# Patient Record
Sex: FEMALE | Race: OTHER
[De-identification: names, ages, dates, MRNs, and addresses within clinical notes are randomized per-mention and may not be internally consistent; named-entity substitution may affect disease eponyms.]

---

## 2018-04-20 NOTE — RADIOLOGY REPORT (SQ)
EXAM DESCRIPTION:  WRIST LEFT 3 VIEWS



COMPLETED DATE/TIME:  4/20/2018 10:33 pm



REASON FOR STUDY:  painx 3 days



COMPARISON:  None.



NUMBER OF VIEWS:  Three views.



TECHNIQUE:  AP, lateral, and oblique radiographic images acquired of the left wrist.



LIMITATIONS:  None.



FINDINGS:  MINERALIZATION: Normal.

BONES: No acute fracture or dislocation.  No worrisome bone lesions.  Normal alignment.

SOFT TISSUES: No soft tissue swelling.  No foreign body.

OTHER: No other significant finding.



IMPRESSION:   NO RADIOGRAPHIC EVIDENCE OF ACUTE INJURY.



TECHNICAL DOCUMENTATION:  JOB ID:  [id number]

[id number]

 2011 [employer]- All Rights Reserved



Reading location - IP/workstation name: [email or web address]

## 2018-04-20 NOTE — ER DOCUMENT REPORT
HPI





- HPI


Patient complains to provider of: Left wrist pain


Onset: Other


Onset/Duration: Gradual, Persistent


Quality of pain: Throbbing


Severity: Moderate


Pain Level: 4


Context: 





Patient states her left wrist has been hurting for 3 days.  No injury.  Patient 

does have a history of gout but states this does not feel like that.


Exacerbated by: Movement


Relieved by: Denies


Similar symptoms previously: No


Recently seen / treated by doctor: No





- ROS


ROS below otherwise negative: Yes


Systems Reviewed and Negative: Yes All other systems reviewed and negative





- CONSTITUTIONAL


Constitutional: DENIES: Fever





- NEURO


Neurology: DENIES: Headache





- CARDIOVASCULAR


Cardiovascular: DENIES: Chest pain





- RESPIRATORY


Respiratory: DENIES: Trouble Breathing





- REPRODUCTIVE


Reproductive: DENIES: Pregnant:





- MUSCULOSKELETAL


Musculoskeletal: REPORTS: Extremity pain - Left wrist, Swelling





- DERM


Skin Color: Normal





Past Medical History





- General


Information source: Patient





- Social History


Smoking Status: Unknown if Ever Smoked


Frequency of alcohol use: None


Drug Abuse: Marijuana


Lives with: Family


Family History: Reviewed & Not Pertinent, CAD, DM, Hypertension





- Past Medical History


Cardiac Medical History: Reports: Hx Hypertension


Endocrine Medical History: Reports: Hx Diabetes Mellitus Type 2


GI Medical History: Reports: Hx Gastroesophageal Reflux Disease


Musculoskeltal Medical History: Reports Hx Arthritis - All over


Psychiatric Medical History: Reports: Hx Depression


Past Surgical History: Reports: Hx Hysterectomy, Hx Orthopedic Surgery - knee 

surgery, Hx Tubal Ligation





- Immunizations


Hx Diphtheria, Pertussis, Tetanus Vaccination: No





Vertical Provider Document





- CONSTITUTIONAL


Agree With Documented VS: Yes


Exam Limitations: No Limitations


General Appearance: WD/WN, Mild Distress


Notes: 





Patient teary





- INFECTION CONTROL


TRAVEL OUTSIDE OF THE [place] IN LAST 30 DAYS: No





- HEENT


HEENT: Normocephalic





- RESPIRATORY


Respiratory: Breath Sounds Normal, No Respiratory Distress





- CARDIOVASCULAR


Cardiovascular: Regular Rate, Regular Rhythm





- MUSCULOSKELETAL/EXTREMETIES


Musculoskeletal/Extremeties: Tender, Edema.  negative: Eccymosis


Notes: 





Mild edema and tenderness noted to left wrist.  No erythema or warmth.  





- NEURO


Level of Consciousness: Awake, Alert, Appropriate





- DERM


Integumentary: Warm, Dry.  negative: Rash





Course





- Re-evaluation


Re-evalutation: 





04/20/18 23:00


X-rays negative and this was discussed with the patient.  Patient tells me at 

this time she has high blood pressure, and has been here visiting her family.  

Out of her blood pressure medicine for about 1 month.  Daughter verified doses 

of medication she takes with empty bottles at the house.  Patient states she is 

under a lot of stress at home


04/20/18 23:01





04/21/18 02:15








- Vital Signs


Vital signs: 


 











Temp Pulse Resp BP Pulse Ox


 


 98.4 F   81   18   207/105 H  97 


 


 04/20/18 20:54  04/20/18 20:54  04/20/18 20:54  04/20/18 20:54  04/20/18 20:54














Procedures





- Immobilization


  ** Left Wrist


Pre-Proc Neuro Vasc Exam: Normal


Immobilizer type: Cock-up


Performed by: PCT


Post-Proc Neuro Vasc Exam: Normal


Alignment checked and good: Yes





Discharge





- Discharge


Clinical Impression: 


 Acute pain of left wrist, Medication refill





Hypertension


Qualifiers:


 Hypertension type: unspecified Qualified Code(s): I10 - Essential (primary) 

hypertension





Condition: Good


Disposition: HOME, SELF-CARE


Additional Instructions: 


Ice and elevate left wrist


Ibuprofen every 8 hours as needed for pain, ice and elevate


1 month pressure meds will be given to you today, you must see your primary 

care physician for further refills


Return as needed


Prescriptions: 


Amlodipine Besylate 5 mg PO DAILY #30 tab


Naproxen 375 mg PO BID PRN #10 tablet


 PRN Reason: 


Nebivolol HCl [Bystolic 5 mg Tablet] 5 mg PO DAILY #30 tablet


Forms:  Elevated Blood Pressure

## 2020-01-03 NOTE — EKG REPORT
SEVERITY:- ABNORMAL ECG -

SINUS RHYTHM

PROBABLE LVH WITH SECONDARY REPOL ABNRM

CONSIDER ANTERIOR INFARCT

:

Confirmed by: Maximo Oseguera MD 03-Jan-2020 08:21:15

## 2020-06-03 ENCOUNTER — HOSPITAL ENCOUNTER (EMERGENCY)
Dept: HOSPITAL 62 - ER | Age: 55
Discharge: LEFT BEFORE DISCHARGE | End: 2020-06-03
Payer: COMMERCIAL

## 2020-06-03 VITALS — SYSTOLIC BLOOD PRESSURE: 119 MMHG | DIASTOLIC BLOOD PRESSURE: 84 MMHG

## 2020-06-03 DIAGNOSIS — E11.9: ICD-10-CM

## 2020-06-03 DIAGNOSIS — R06.02: ICD-10-CM

## 2020-06-03 DIAGNOSIS — F12.10: ICD-10-CM

## 2020-06-03 DIAGNOSIS — Z88.7: ICD-10-CM

## 2020-06-03 DIAGNOSIS — Z88.0: ICD-10-CM

## 2020-06-03 DIAGNOSIS — Z53.29: ICD-10-CM

## 2020-06-03 DIAGNOSIS — Z79.899: ICD-10-CM

## 2020-06-03 DIAGNOSIS — Z88.1: ICD-10-CM

## 2020-06-03 DIAGNOSIS — R07.89: Primary | ICD-10-CM

## 2020-06-03 DIAGNOSIS — Z88.8: ICD-10-CM

## 2020-06-03 DIAGNOSIS — I10: ICD-10-CM

## 2020-06-03 DIAGNOSIS — F17.200: ICD-10-CM

## 2020-06-03 LAB
ADD MANUAL DIFF: NO
ALBUMIN SERPL-MCNC: 3.8 G/DL (ref 3.5–5)
ALP SERPL-CCNC: 112 U/L (ref 38–126)
ANION GAP SERPL CALC-SCNC: 5 MMOL/L (ref 5–19)
AST SERPL-CCNC: 19 U/L (ref 14–36)
BASOPHILS # BLD AUTO: 0 10^3/UL (ref 0–0.2)
BASOPHILS NFR BLD AUTO: 0.4 % (ref 0–2)
BILIRUB DIRECT SERPL-MCNC: 0 MG/DL (ref 0–0.4)
BILIRUB SERPL-MCNC: 0.3 MG/DL (ref 0.2–1.3)
BUN SERPL-MCNC: 9 MG/DL (ref 7–20)
CALCIUM: 8.9 MG/DL (ref 8.4–10.2)
CHLORIDE SERPL-SCNC: 106 MMOL/L (ref 98–107)
CK MB SERPL-MCNC: 0.53 NG/ML (ref ?–4.55)
CK SERPL-CCNC: 63 U/L (ref 30–135)
CO2 SERPL-SCNC: 28 MMOL/L (ref 22–30)
EOSINOPHIL # BLD AUTO: 0.5 10^3/UL (ref 0–0.6)
EOSINOPHIL NFR BLD AUTO: 3.3 % (ref 0–6)
ERYTHROCYTE [DISTWIDTH] IN BLOOD BY AUTOMATED COUNT: 14.7 % (ref 11.5–14)
GLUCOSE SERPL-MCNC: 108 MG/DL (ref 75–110)
HCT VFR BLD CALC: 38 % (ref 36–47)
HGB BLD-MCNC: 12.9 G/DL (ref 12–15.5)
LYMPHOCYTES # BLD AUTO: 3.8 10^3/UL (ref 0.5–4.7)
LYMPHOCYTES NFR BLD AUTO: 27.5 % (ref 13–45)
MCH RBC QN AUTO: 29.4 PG (ref 27–33.4)
MCHC RBC AUTO-ENTMCNC: 33.8 G/DL (ref 32–36)
MCV RBC AUTO: 87 FL (ref 80–97)
MONOCYTES # BLD AUTO: 1.2 10^3/UL (ref 0.1–1.4)
MONOCYTES NFR BLD AUTO: 8.7 % (ref 3–13)
NEUTROPHILS # BLD AUTO: 8.3 10^3/UL (ref 1.7–8.2)
NEUTS SEG NFR BLD AUTO: 60.1 % (ref 42–78)
PLATELET # BLD: 404 10^3/UL (ref 150–450)
POTASSIUM SERPL-SCNC: 3.6 MMOL/L (ref 3.6–5)
PROT SERPL-MCNC: 7.7 G/DL (ref 6.3–8.2)
RBC # BLD AUTO: 4.37 10^6/UL (ref 3.72–5.28)
TOTAL CELLS COUNTED % (AUTO): 100 %
TROPONIN I SERPL-MCNC: < 0.012 NG/ML
WBC # BLD AUTO: 13.8 10^3/UL (ref 4–10.5)

## 2020-06-03 PROCEDURE — 85025 COMPLETE CBC W/AUTO DIFF WBC: CPT

## 2020-06-03 PROCEDURE — 84484 ASSAY OF TROPONIN QUANT: CPT

## 2020-06-03 PROCEDURE — 82550 ASSAY OF CK (CPK): CPT

## 2020-06-03 PROCEDURE — 36415 COLL VENOUS BLD VENIPUNCTURE: CPT

## 2020-06-03 PROCEDURE — 93010 ELECTROCARDIOGRAM REPORT: CPT

## 2020-06-03 PROCEDURE — 99285 EMERGENCY DEPT VISIT HI MDM: CPT

## 2020-06-03 PROCEDURE — 82553 CREATINE MB FRACTION: CPT

## 2020-06-03 PROCEDURE — 93005 ELECTROCARDIOGRAM TRACING: CPT

## 2020-06-03 PROCEDURE — 80053 COMPREHEN METABOLIC PANEL: CPT

## 2020-06-03 NOTE — ER DOCUMENT REPORT
ED General





- General


Chief Complaint: Chest Pain


Stated Complaint: CHEST PAIN


Time Seen by Provider: 06/03/20 16:21


Mode of Arrival: Wheelchair


TRAVEL OUTSIDE OF THE [place] IN LAST 30 DAYS: No





- HPI


Notes: 





Patient is a 55-year-old female who presents to the emergency department for 

evaluation of chest pain.  She states that she got this chest pain when she was 

receiving news that she had been affected.  It was a tightness, with a little 

bit of radiation to her back.  She did say she had some shortness of breath.  It

was relieved by nitro.  The patient has had a stress test in the past when she 

lived in [place], but cannot remember how long ago that was.  She has had high 

blood pressure, takes her medication as prescribed.  Patient denies any pain at 

this time.





- Related Data


Allergies/Adverse Reactions: 


                                        





succinylcholine chloride [From Anectine] Allergy (Severe, Verified 12/14/15 

16:37)


   Over sedation


azithromycin [From Zithromax] Allergy (Verified 09/26/12 00:30)


   


ciprofloxacin [From Cipro] Allergy (Verified 09/26/12 00:30)


   


Penicillins Allergy (Verified 09/26/12 00:30)


   hives,tongue swells


influenza virus vaccine trivalent [Influenza Virus Vacc,Trivl] Adverse Reaction 

(Verified 12/21/15 08:52)


   HEAD COLD X 3-4 MONTHS


Antibiotics Allergy (Severe, Uncoded 12/14/15 16:37)


   Swelling, hives with most antibiotics











Past Medical History





- General


Information source: Patient





- Social History


Smoking Status: Current Every Day Smoker


Frequency of alcohol use: None


Drug Abuse: Marijuana


Family History: Reviewed & Not Pertinent, DM, Hypertension, Other - No premature

coronary artery disease in the family


Patient has homicidal ideation: No





- Past Medical History


Cardiac Medical History: Reports: Hx Hypertension


   Denies: Hx Coronary Artery Disease, Hx Heart Attack


Pulmonary Medical History: 


   Denies: Hx Asthma, Hx Bronchitis, Hx COPD, Hx Pneumonia


Neurological Medical History: Reports: Hx Cerebrovascular Accident.  Denies: Hx 

Seizures


Endocrine Medical History: Reports: Hx Diabetes Mellitus Type 2


Renal/ Medical History: Denies: Hx Peritoneal Dialysis


GI Medical History: Reports: Hx Gastroesophageal Reflux Disease


Musculoskeletal Medical History: Reports Hx Arthritis - All over


Psychiatric Medical History: Reports: Hx Depression


Past Surgical History: Reports: Hx Hysterectomy, Hx Orthopedic Surgery - knee 

surgery, Hx Tubal Ligation.  Denies: Hx Pacemaker





- Immunizations


Hx Diphtheria, Pertussis, Tetanus Vaccination: No





Review of Systems





- Review of Systems


Cardiovascular: See HPI


Respiratory: See HPI





Physical Exam





- Vital signs


Vitals: 





                                        











Temp Pulse Resp BP Pulse Ox


 


 98.2 F   86   16   154/139 H  96 


 


 06/03/20 15:53  06/03/20 15:53  06/03/20 15:53  06/03/20 15:53  06/03/20 15:53














- Notes


Notes: 





This is a pleasant 55-year-old female, tearful, who appears her stated age, in 

no acute distress.  Vital signs reviewed, please refer to chart. Head is 

normocephalic, atraumatic.  Pupils equal round, reactive to light.  Neck is 

supple without meningismus.  Heart is regular rate and rhythm.  Lungs are clear 

to auscultation bilaterally.  Abdomen is soft, nontender, normoactive bowel 

sounds throughout.  Extremities without cyanosis, clubbing. Posterior calves are

nontender.  Peripheral pulses are equal.  Skin is warm and dry.  Patient is 

awake, alert, neurological exam is nonfocal.





Course





- Re-evaluation


Re-evalutation: 





06/03/20 19:52


Patient presents to the emergency department for evaluation.  She had laboratory

investigations and EKG as ordered.  She was chest pain-free throughout the 

course of her stay.  Her blood pressure was mildly elevated, but otherwise her 

vitals were normal.  The patient did not have any more chest pain.  I explained 

her that she should have a second troponin, given her risk factors.  The patient

states she wants to get home.  She wants to take care of things involving her 

infection.  She decided to leave AGAINST MEDICAL ADVICE.  I explained to her 

that she could come back at any time and she voiced understanding to this.  

Otherwise I told her that she should try to quit smoking, make sure her sugars 

are well controlled, and follow-up with her primary care provider in regards to 

possible cardiology referral, stress test.  She voiced understanding of his 

discharge AGAINST MEDICAL ADVICE.





- Vital Signs


Vital signs: 





                                        











Temp Pulse Resp BP Pulse Ox


 


 98.1 F   86   16   154/139 H  96 


 


 06/03/20 16:19  06/03/20 15:53  06/03/20 15:53  06/03/20 15:53  06/03/20 15:53














- Laboratory


Result Diagrams: 


                                 06/03/20 18:12





                                 06/03/20 18:12


Laboratory results interpreted by me: 





                                        











  06/03/20





  18:12


 


WBC  13.8 H


 


RDW  14.7 H


 


Absolute Neuts (auto)  8.3 H














- EKG Interpretation by Me


Additional EKG results interpreted by me: 





06/03/20 19:53


Sinus mechanism with rate of 81 bpm.  Left axis deviation.  Normal intervals.  

Nonspecific ST changes, but no acute elevations concerning for infarction.  No 

old studies available for comparison.





Discharge





- Discharge


Clinical Impression: 


Chest pain


Qualifiers:


 Chest pain type: unspecified Qualified Code(s): R07.9 - Chest pain, unspecified





Disposition: AGAINST MEDICAL ADVICE


Instructions:  Chest Pain of Unclear Cause ([facility])


Additional Instructions: 


No clear cause was identified for your chest pain.  You decided to leave AGAINST

MEDICAL ADVICE before more tests could be performed.  Please return at any time 

if your pain returns, or if you change your mind regarding completing your 

evaluation.  Return to the emergency department with worsening or new concerning

symptoms of any sort.  Please follow-up closely with your primary care provider,

they should consider possible stress test as an outpatient.

## 2020-06-03 NOTE — EKG REPORT
SEVERITY:- ABNORMAL ECG -

SINUS RHYTHM

LVH WITH SECONDARY REPOLARIZATION ABNORMALITY

:

Confirmed by: Carolina Dominguez 03-Jun-2020 23:21:53

## 2020-06-03 NOTE — ER DOCUMENT REPORT
ED Medical Screen (RME)





- General


Chief Complaint: Chest Pain


Stated Complaint: CHEST PAIN


Time Seen by Provider: 06/03/20 16:21


Mode of Arrival: Wheelchair


Information source: Patient


Notes: 





HPI; 55-year-old female past medical history significant for heart murmur, 

Bell's palsy, CVA, hypertension, diabetes, depression, anxiety presents to the 

emergency room with sudden onset of midsternal aching stabbing chest pain that 

started after receiving bad news that she is being evicted from her apartment.  

No previous MIs.  Was given 324 of aspirin and 2 sprays of nitro by EMS with 

relief of her pain.





PE: Alert and oriented x3.  Moderate distress noted.  Lungs are clear to 

auscultation without rales, rhonchi, wheezes.  Heart: Regular rate and rhythm 

without murmurs, rubs, gallops








I have greeted and performed a rapid initial assessment of this patient.  A 

comprehensive ED assessment and evaluation of the patient, analysis of test 

results and completion of the medical decision making process will be conducted 

by additional ED providers.  I have specifically instructed the patient or 

family members with the patient to immediately return to any nursing staff 

should anything change in the patient's condition or with their chief complaint.


TRAVEL OUTSIDE OF THE [place] IN LAST 30 DAYS: No





- Related Data


Allergies/Adverse Reactions: 


                                        





succinylcholine chloride [From Anectine] Allergy (Severe, Verified 12/14/15 

16:37)


   Over sedation


azithromycin [From Zithromax] Allergy (Verified 09/26/12 00:30)


   


ciprofloxacin [From Cipro] Allergy (Verified 09/26/12 00:30)


   


Penicillins Allergy (Verified 09/26/12 00:30)


   hives,tongue swells


influenza virus vaccine trivalent [Influenza Virus Vacc,Trivl] Adverse Reaction 

(Verified 12/21/15 08:52)


   HEAD COLD X 3-4 MONTHS


Antibiotics Allergy (Severe, Uncoded 12/14/15 16:37)


   Swelling, hives with most antibiotics











Past Medical History





- Social History


Frequency of alcohol use: None


Drug Abuse: Marijuana





- Past Medical History


Cardiac Medical History: Reports: Hx Hypertension


   Denies: Hx Coronary Artery Disease, Hx Heart Attack


Pulmonary Medical History: 


   Denies: Hx Asthma, Hx Bronchitis, Hx COPD, Hx Pneumonia


Neurological Medical History: Denies: Hx Cerebrovascular Accident, Hx Seizures


Endocrine Medical History: Reports: Hx Diabetes Mellitus Type 2


Renal/ Medical History: Denies: Hx Peritoneal Dialysis


GI Medical History: Reports: Hx Gastroesophageal Reflux Disease


Musculoskeltal Medical History: Reports Hx Arthritis - All over


Psychiatric Medical History: Reports: Hx Depression


Past Surgical History: Reports: Hx Hysterectomy, Hx Orthopedic Surgery - knee 

surgery, Hx Tubal Ligation.  Denies: Hx Pacemaker





- Immunizations


Hx Diphtheria, Pertussis, Tetanus Vaccination: No





Physical Exam





- Vital signs


Vitals: 





                                        











Temp


 


 98.1 F 


 


 06/03/20 16:19














Course





- Vital Signs


Vital signs: 





                                        











Temp Pulse Resp BP Pulse Ox


 


 98.1 F             


 


 06/03/20 16:19

## 2020-10-26 ENCOUNTER — HOSPITAL ENCOUNTER (EMERGENCY)
Dept: HOSPITAL 62 - ER | Age: 55
Discharge: HOME | End: 2020-10-26
Payer: MEDICAID

## 2020-10-26 VITALS — DIASTOLIC BLOOD PRESSURE: 98 MMHG | SYSTOLIC BLOOD PRESSURE: 189 MMHG

## 2020-10-26 DIAGNOSIS — M41.9: ICD-10-CM

## 2020-10-26 DIAGNOSIS — I10: ICD-10-CM

## 2020-10-26 DIAGNOSIS — E11.9: ICD-10-CM

## 2020-10-26 DIAGNOSIS — M54.41: Primary | ICD-10-CM

## 2020-10-26 DIAGNOSIS — F17.200: ICD-10-CM

## 2020-10-26 PROCEDURE — 72070 X-RAY EXAM THORAC SPINE 2VWS: CPT

## 2020-10-26 PROCEDURE — 72110 X-RAY EXAM L-2 SPINE 4/>VWS: CPT

## 2020-10-26 PROCEDURE — 99284 EMERGENCY DEPT VISIT MOD MDM: CPT

## 2020-10-26 NOTE — RADIOLOGY REPORT (SQ)
EXAM DESCRIPTION:  L SPINE WHOLE



IMAGES COMPLETED DATE/TIME:  10/26/2020 2:29 pm



REASON FOR STUDY:  back pain



COMPARISON:  8/14/2015



NUMBER OF VIEWS:  Five views including obliques.



TECHNIQUE:  AP, lateral, oblique, and sacral radiographic images acquired of the lumbar spine.



LIMITATIONS:  None.



FINDINGS:  MINERALIZATION: Normal.

SEGMENTATION: Normal.  No transitional anatomy.

ALIGNMENT: Normal.

VERTEBRAE: Maintained height.  No fracture or worrisome bone lesion.

DISCS: Preserved height.  No significant osteophytes or end plate irregularity.

POSTERIOR ELEMENTS: Pedicles and facets are intact.  No pars defect or posterior arch defects.

HARDWARE: None in the spine.

PARASPINAL SOFT TISSUES: Normal.

PELVIS: Intact as visualized. No fractures or worrisome bone lesions. SI joints intact.

OTHER: No other significant finding.



IMPRESSION:  NORMAL 5 VIEW LUMBAR SPINE.



TECHNICAL DOCUMENTATION:  JOB ID:  [id number]

 2011 [employer]- All Rights Reserved



Reading location - IP/workstation name: [email or web address]

## 2020-10-26 NOTE — ER DOCUMENT REPORT
HPI





- HPI


Patient complains to provider of: Back pain


Time Seen by Provider: 10/26/20 13:49


Onset: Other - 3 weeks


Context: 





She states that she jumped a ditch 3 weeks ago and aristides her back.  Patient 

denies any fall.  Patient denies any fever.  Patient denies any urinary 

retention or incontinence.  Patient states she has had persistent pain since 

then that she cannot tolerate anymore.  Patient has a follow-up appointment with

her doctor in a week.  Patient does have a history of chronic back pain and 

fibromyalgia.


Associated Symptoms: Other - Low back pain


Exacerbated by: Movement, Walking


Relieved by: Denies


Similar symptoms previously: Yes


Recently seen / treated by doctor: No





- ROS


ROS below otherwise negative: Yes


Systems Reviewed and Negative: Yes All other systems reviewed and negative





- CONSTITUTIONAL


Constitutional: DENIES: Fever, Chills





- NEURO


Neurology: DENIES: Weakness





- GASTROINTESTINAL


Gastrointestinal: DENIES: Nausea





- URINARY


Urinary: DENIES: Dysuria, Urgency, Frequency





- MUSCULOSKELETAL


Musculoskeletal: REPORTS: Back Pain





- DERM


Skin Color: Normal


Skin Problems: None





Past Medical History





- General


Information source: Patient





- Social History


Smoking Status: Current Every Day Smoker


Frequency of alcohol use: None


Drug Abuse: None


Occupation: None


Family History: Reviewed & Not Pertinent, DM, Hypertension, Other - No premature

coronary artery disease in the family





- Past Medical History


Cardiac Medical History: Reports: Hx Hypertension


   Denies: Hx Coronary Artery Disease, Hx Heart Attack


Pulmonary Medical History: 


   Denies: Hx Asthma, Hx Bronchitis, Hx COPD, Hx Pneumonia


Neurological Medical History: Reports: Hx Cerebrovascular Accident.  Denies: Hx 

Seizures


Endocrine Medical History: Reports: Hx Diabetes Mellitus Type 2


Renal/ Medical History: Denies: Hx Peritoneal Dialysis


GI Medical History: Reports: Hx Gastroesophageal Reflux Disease


Musculoskeletal Medical History: Reports Hx Arthritis - All over


Psychiatric Medical History: Reports: Hx Depression


Past Surgical History: Reports: Hx Hysterectomy, Hx Orthopedic Surgery - knee 

surgery, Hx Tubal Ligation





- Immunizations


Hx Diphtheria, Pertussis, Tetanus Vaccination: No





Vertical Provider Document





- CONSTITUTIONAL


Agree With Documented VS: Yes


Exam Limitations: No Limitations


General Appearance: WD/WN, No Apparent Distress


Notes: 





PHYSICAL EXAMINATION:





GENERAL: Well-appearing, well-nourished and in no acute distress.





HEAD: Atraumatic, normocephalic.





EYES: sclera clear, anicteric, conjunctiva are normal.





ENT: nares patent, Moist mucous membranes.





NECK: Normal range of motion, supple no lymphadenopathy





LUNGS: respirations unlabored





HEART: Regular rate and rhythm without murmurs 





EXTREMITIES: Normal range of motion, no pitting or edema.  No cyanosis. Gait 

normal, pt ambulates without difficulty





BACK: Thoracolumbar midline tenderness, right SI joint tenderness, no 

deformities or step-offs.  No CVA tenderness. 





NEUROLOGICAL: Cranial nerves grossly intact.  Normal speech, normal gait.  No 

saddle anesthesia. 





PSYCH: Normal mood, normal affect.





SKIN: Warm, Dry, normal turgor, no rashes or lesions noted.











- INFECTION CONTROL


TRAVEL OUTSIDE OF THE [place] IN LAST 30 DAYS: No





Course





- Re-evaluation


Re-evalutation: 





10/26/20 15:12


The patient presents with low back pain without signs of spinal cord 

compression, cauda equina syndrome, infection, aneurysm, or other serious 

etiology.  The patient is neurologically intact.  Given the extremely risk of 

these diagnoses further testing and evaluation for these possibilities does not 

appear to be indicated at this time.  Patient has been instructed to return if 

the symptoms worsen or change in any way.





- Vital Signs


Vital signs: 


                                        











Temp Pulse Resp BP Pulse Ox


 


 98.2 F   89   18   186/103 H  97 


 


 10/26/20 13:42  10/26/20 13:42  10/26/20 13:42  10/26/20 13:42  10/26/20 13:42














- Diagnostic Test


Radiology reviewed: Image reviewed, Reports reviewed





Discharge





- Discharge


Clinical Impression: 


Low back pain


Qualifiers:


 Chronicity: unspecified Back pain laterality: unspecified Sciatica presence: 

with sciatica Sciatica laterality: sciatica of right side Qualified Code(s): 

M54.41 - Lumbago with sciatica, right side





Condition: Stable


Disposition: HOME, SELF-CARE


Instructions:  Ice Packs ([facility]), Low Back Pain ([facility]), Oral Narcotic Medication 

([facility])


Additional Instructions: 


Return immediately for any new or worsening symptoms





Followup with your primary care provider, call tomorrow to make a followup 

appointment





Follow-up with orthopedics for further evaluation of your back pain.





Follow-up with pain management for any persistent back pain problems





Do Not take the hydrocodone if you are taking your diazepam to avoid any adverse

interactions between the medications.


Prescriptions: 


Lidocaine [Lidoderm 5% (700 mg) Transdermal Patch] 1 patch TP DAILY PRN #10 

adh..patch


 PRN Reason: 


Hydrocodone/Acetaminophen [Norco 5-325 mg Tablet] 1 tab PO Q6 PRN #15 tablet


 PRN Reason: 


Referrals: 


[facility] [Provider Group] - Follow up as needed


[facility] [Provider Group] - Follow up as needed

## 2020-10-26 NOTE — RADIOLOGY REPORT (SQ)
EXAM DESCRIPTION:  T SPINE AP/LAT



IMAGES COMPLETED DATE/TIME:  10/26/2020 2:29 pm



REASON FOR STUDY:  back pain



COMPARISON:  None.



NUMBER OF VIEWS:  Two views.



TECHNIQUE:  AP and lateral radiographic images acquired of the thoracic spine.



LIMITATIONS:  None.



FINDINGS:  MINERALIZATION: Normal.

ALIGNMENT: There is scoliosis with concavity toward the left in the upper dorsal spine and toward the
 right in the thoracolumbar spine.  .

VERTEBRAE: No fracture or bone lesion.  Maintained height, normal segmentation.

DISCS: No significant loss of height or significant narrowing.  No large osteophytes.

HARDWARE: None in the spine.

MEDIASTINUM AND SOFT TISSUES: Normal heart size and aortic contour.  No soft tissue abnormality.

VISUALIZED LUNG FIELDS: Clear.

OTHER: No other significant finding.



IMPRESSION:  Scoliosis.  No acute findings.



TECHNICAL DOCUMENTATION:  JOB ID:  [id number]

 2011 [employer]- All Rights Reserved



Reading location - IP/workstation name: [email or web address]

## 2022-09-19 ENCOUNTER — RX ONLY (RX ONLY)
Age: 57
End: 2022-09-19

## 2022-09-19 ENCOUNTER — OFFICE (OUTPATIENT)
Dept: URBAN - METROPOLITAN AREA CLINIC 121 | Facility: CLINIC | Age: 57
Setting detail: OPHTHALMOLOGY
End: 2022-09-19
Payer: COMMERCIAL

## 2022-09-19 DIAGNOSIS — G51.0: ICD-10-CM

## 2022-09-19 DIAGNOSIS — H35.033: ICD-10-CM

## 2022-09-19 DIAGNOSIS — E11.9: ICD-10-CM

## 2022-09-19 DIAGNOSIS — H25.13: ICD-10-CM

## 2022-09-19 DIAGNOSIS — H35.3131: ICD-10-CM

## 2022-09-19 DIAGNOSIS — H16.223: ICD-10-CM

## 2022-09-19 PROCEDURE — 92250 FUNDUS PHOTOGRAPHY W/I&R: CPT | Performed by: OPHTHALMOLOGY

## 2022-09-19 PROCEDURE — 92004 COMPRE OPH EXAM NEW PT 1/>: CPT | Performed by: OPHTHALMOLOGY

## 2022-09-19 ASSESSMENT — KERATOMETRY
OS_K2POWER_DIOPTERS: 44.00
OD_AXISANGLE_DEGREES: 904
OD_K1POWER_DIOPTERS: 44.00
OS_AXISANGLE_DEGREES: 177
OD_K2POWER_DIOPTERS: 44.00
OS_K1POWER_DIOPTERS: 3.75

## 2022-09-19 ASSESSMENT — PACHYMETRY
OS_CT_CORRECTION: -6
OS_CT_UM: 638
OD_CT_CORRECTION: -7
OD_CT_UM: 642

## 2022-09-19 ASSESSMENT — REFRACTION_CURRENTRX
OD_OVR_VA: 20/
OD_CYLINDER: +0.50
OD_SPHERE: +2.50
OD_AXIS: 153
OS_CYLINDER: +1.00
OS_OVR_VA: 20/
OS_SPHERE: +1.25
OS_AXIS: 068

## 2022-09-19 ASSESSMENT — REFRACTION_AUTOREFRACTION
OS_CYLINDER: +0.50
OD_AXIS: 008
OS_AXIS: 013
OD_CYLINDER: +0.75
OD_SPHERE: +1.75
OS_SPHERE: +1.75

## 2022-09-19 ASSESSMENT — REFRACTION_MANIFEST
OS_AXIS: 0
OD_ADD: +1.75
OS_CYLINDER: SPH
OD_AXIS: 0
OS_SPHERE: +1.25
OD_SPHERE: +1.25
OS_ADD: +1.75
OD_CYLINDER: SPH

## 2022-09-19 ASSESSMENT — VISUAL ACUITY
OS_BCVA: 20/100
OD_BCVA: 20/60

## 2022-09-19 ASSESSMENT — SPHEQUIV_DERIVED
OS_SPHEQUIV: 2
OD_SPHEQUIV: 2.125

## 2022-09-19 ASSESSMENT — SUPERFICIAL PUNCTATE KERATITIS (SPK)
OS_SPK: 2+
OD_SPK: T

## 2022-09-19 ASSESSMENT — CONFRONTATIONAL VISUAL FIELD TEST (CVF)
OS_FINDINGS: FULL
OD_FINDINGS: FULL

## 2022-09-19 ASSESSMENT — TONOMETRY
OS_IOP_MMHG: 17
OD_IOP_MMHG: 17

## 2022-09-19 ASSESSMENT — AXIALLENGTH_DERIVED
OD_AL: 22.6219
OS_AL: 32.43